# Patient Record
Sex: FEMALE | Race: OTHER | HISPANIC OR LATINO
[De-identification: names, ages, dates, MRNs, and addresses within clinical notes are randomized per-mention and may not be internally consistent; named-entity substitution may affect disease eponyms.]

---

## 2017-02-23 ENCOUNTER — RECORD ABSTRACTING (OUTPATIENT)
Age: 42
End: 2017-02-23

## 2017-02-23 DIAGNOSIS — Z87.42 PERSONAL HISTORY OF OTHER DISEASES OF THE FEMALE GENITAL TRACT: ICD-10-CM

## 2017-02-23 DIAGNOSIS — Z12.4 ENCOUNTER FOR SCREENING FOR MALIGNANT NEOPLASM OF CERVIX: ICD-10-CM

## 2017-02-23 DIAGNOSIS — O09.529 SUPERVISION OF ELDERLY MULTIGRAVIDA, UNSPECIFIED TRIMESTER: ICD-10-CM

## 2017-02-23 DIAGNOSIS — Z87.59 PERSONAL HISTORY OF OTHER COMPLICATIONS OF PREGNANCY, CHILDBIRTH AND THE PUERPERIUM: ICD-10-CM

## 2017-02-23 DIAGNOSIS — Z83.49 FAMILY HISTORY OF OTHER ENDOCRINE, NUTRITIONAL AND METABOLIC DISEASES: ICD-10-CM

## 2017-02-23 DIAGNOSIS — K21.9 GASTRO-ESOPHAGEAL REFLUX DISEASE W/OUT ESOPHAGITIS: ICD-10-CM

## 2017-04-24 ENCOUNTER — RX RENEWAL (OUTPATIENT)
Age: 42
End: 2017-04-24

## 2018-05-31 ENCOUNTER — RX RENEWAL (OUTPATIENT)
Age: 43
End: 2018-05-31

## 2019-02-08 ENCOUNTER — OUTPATIENT (OUTPATIENT)
Dept: OUTPATIENT SERVICES | Facility: HOSPITAL | Age: 44
LOS: 1 days | Discharge: HOME | End: 2019-02-08

## 2019-02-08 ENCOUNTER — RESULT REVIEW (OUTPATIENT)
Age: 44
End: 2019-02-08

## 2019-02-11 DIAGNOSIS — Z01.419 ENCOUNTER FOR GYNECOLOGICAL EXAMINATION (GENERAL) (ROUTINE) WITHOUT ABNORMAL FINDINGS: ICD-10-CM

## 2019-11-05 ENCOUNTER — EMERGENCY (EMERGENCY)
Facility: HOSPITAL | Age: 44
LOS: 0 days | Discharge: HOME | End: 2019-11-05
Attending: EMERGENCY MEDICINE | Admitting: EMERGENCY MEDICINE
Payer: COMMERCIAL

## 2019-11-05 VITALS
SYSTOLIC BLOOD PRESSURE: 135 MMHG | DIASTOLIC BLOOD PRESSURE: 75 MMHG | OXYGEN SATURATION: 98 % | RESPIRATION RATE: 20 BRPM | HEART RATE: 93 BPM | TEMPERATURE: 97 F

## 2019-11-05 VITALS
DIASTOLIC BLOOD PRESSURE: 74 MMHG | TEMPERATURE: 97 F | OXYGEN SATURATION: 100 % | SYSTOLIC BLOOD PRESSURE: 139 MMHG | RESPIRATION RATE: 18 BRPM | HEART RATE: 78 BPM

## 2019-11-05 DIAGNOSIS — R10.9 UNSPECIFIED ABDOMINAL PAIN: ICD-10-CM

## 2019-11-05 DIAGNOSIS — K57.92 DIVERTICULITIS OF INTESTINE, PART UNSPECIFIED, WITHOUT PERFORATION OR ABSCESS WITHOUT BLEEDING: ICD-10-CM

## 2019-11-05 DIAGNOSIS — D25.9 LEIOMYOMA OF UTERUS, UNSPECIFIED: ICD-10-CM

## 2019-11-05 DIAGNOSIS — K76.0 FATTY (CHANGE OF) LIVER, NOT ELSEWHERE CLASSIFIED: ICD-10-CM

## 2019-11-05 LAB
ALBUMIN SERPL ELPH-MCNC: 4.3 G/DL — SIGNIFICANT CHANGE UP (ref 3.5–5.2)
ALP SERPL-CCNC: 79 U/L — SIGNIFICANT CHANGE UP (ref 30–115)
ALT FLD-CCNC: 35 U/L — SIGNIFICANT CHANGE UP (ref 0–41)
ANION GAP SERPL CALC-SCNC: 11 MMOL/L — SIGNIFICANT CHANGE UP (ref 7–14)
APPEARANCE UR: ABNORMAL
AST SERPL-CCNC: 24 U/L — SIGNIFICANT CHANGE UP (ref 0–41)
BACTERIA # UR AUTO: NEGATIVE — SIGNIFICANT CHANGE UP
BASOPHILS # BLD AUTO: 0.02 K/UL — SIGNIFICANT CHANGE UP (ref 0–0.2)
BASOPHILS NFR BLD AUTO: 0.2 % — SIGNIFICANT CHANGE UP (ref 0–1)
BILIRUB SERPL-MCNC: <0.2 MG/DL — SIGNIFICANT CHANGE UP (ref 0.2–1.2)
BILIRUB UR-MCNC: NEGATIVE — SIGNIFICANT CHANGE UP
BUN SERPL-MCNC: 14 MG/DL — SIGNIFICANT CHANGE UP (ref 10–20)
CALCIUM SERPL-MCNC: 9.3 MG/DL — SIGNIFICANT CHANGE UP (ref 8.5–10.1)
CHLORIDE SERPL-SCNC: 101 MMOL/L — SIGNIFICANT CHANGE UP (ref 98–110)
CO2 SERPL-SCNC: 25 MMOL/L — SIGNIFICANT CHANGE UP (ref 17–32)
COLOR SPEC: YELLOW — SIGNIFICANT CHANGE UP
CREAT SERPL-MCNC: 0.7 MG/DL — SIGNIFICANT CHANGE UP (ref 0.7–1.5)
DIFF PNL FLD: NEGATIVE — SIGNIFICANT CHANGE UP
EOSINOPHIL # BLD AUTO: 0.32 K/UL — SIGNIFICANT CHANGE UP (ref 0–0.7)
EOSINOPHIL NFR BLD AUTO: 3.2 % — SIGNIFICANT CHANGE UP (ref 0–8)
EPI CELLS # UR: 3 /HPF — SIGNIFICANT CHANGE UP (ref 0–5)
GLUCOSE SERPL-MCNC: 105 MG/DL — HIGH (ref 70–99)
GLUCOSE UR QL: NEGATIVE — SIGNIFICANT CHANGE UP
HCG SERPL QL: NEGATIVE — SIGNIFICANT CHANGE UP
HCG UR QL: NEGATIVE — SIGNIFICANT CHANGE UP
HCT VFR BLD CALC: 43.6 % — SIGNIFICANT CHANGE UP (ref 37–47)
HGB BLD-MCNC: 13.9 G/DL — SIGNIFICANT CHANGE UP (ref 12–16)
HYALINE CASTS # UR AUTO: 1 /LPF — SIGNIFICANT CHANGE UP (ref 0–7)
IMM GRANULOCYTES NFR BLD AUTO: 0.3 % — SIGNIFICANT CHANGE UP (ref 0.1–0.3)
KETONES UR-MCNC: NEGATIVE — SIGNIFICANT CHANGE UP
LACTATE SERPL-SCNC: 0.8 MMOL/L — SIGNIFICANT CHANGE UP (ref 0.5–2.2)
LEUKOCYTE ESTERASE UR-ACNC: NEGATIVE — SIGNIFICANT CHANGE UP
LIDOCAIN IGE QN: 28 U/L — SIGNIFICANT CHANGE UP (ref 7–60)
LYMPHOCYTES # BLD AUTO: 2.35 K/UL — SIGNIFICANT CHANGE UP (ref 1.2–3.4)
LYMPHOCYTES # BLD AUTO: 23.5 % — SIGNIFICANT CHANGE UP (ref 20.5–51.1)
MCHC RBC-ENTMCNC: 28 PG — SIGNIFICANT CHANGE UP (ref 27–31)
MCHC RBC-ENTMCNC: 31.9 G/DL — LOW (ref 32–37)
MCV RBC AUTO: 87.7 FL — SIGNIFICANT CHANGE UP (ref 81–99)
MONOCYTES # BLD AUTO: 1.06 K/UL — HIGH (ref 0.1–0.6)
MONOCYTES NFR BLD AUTO: 10.6 % — HIGH (ref 1.7–9.3)
NEUTROPHILS # BLD AUTO: 6.21 K/UL — SIGNIFICANT CHANGE UP (ref 1.4–6.5)
NEUTROPHILS NFR BLD AUTO: 62.2 % — SIGNIFICANT CHANGE UP (ref 42.2–75.2)
NITRITE UR-MCNC: NEGATIVE — SIGNIFICANT CHANGE UP
NRBC # BLD: 0 /100 WBCS — SIGNIFICANT CHANGE UP (ref 0–0)
PH UR: 7.5 — SIGNIFICANT CHANGE UP (ref 5–8)
PLATELET # BLD AUTO: 358 K/UL — SIGNIFICANT CHANGE UP (ref 130–400)
POTASSIUM SERPL-MCNC: 4.3 MMOL/L — SIGNIFICANT CHANGE UP (ref 3.5–5)
POTASSIUM SERPL-SCNC: 4.3 MMOL/L — SIGNIFICANT CHANGE UP (ref 3.5–5)
PROT SERPL-MCNC: 7 G/DL — SIGNIFICANT CHANGE UP (ref 6–8)
PROT UR-MCNC: SIGNIFICANT CHANGE UP
RBC # BLD: 4.97 M/UL — SIGNIFICANT CHANGE UP (ref 4.2–5.4)
RBC # FLD: 14.6 % — HIGH (ref 11.5–14.5)
RBC CASTS # UR COMP ASSIST: 1 /HPF — SIGNIFICANT CHANGE UP (ref 0–4)
SODIUM SERPL-SCNC: 137 MMOL/L — SIGNIFICANT CHANGE UP (ref 135–146)
SP GR SPEC: 1.02 — SIGNIFICANT CHANGE UP (ref 1.01–1.02)
UROBILINOGEN FLD QL: ABNORMAL
WBC # BLD: 9.99 K/UL — SIGNIFICANT CHANGE UP (ref 4.8–10.8)
WBC # FLD AUTO: 9.99 K/UL — SIGNIFICANT CHANGE UP (ref 4.8–10.8)
WBC UR QL: 4 /HPF — SIGNIFICANT CHANGE UP (ref 0–5)

## 2019-11-05 PROCEDURE — 76830 TRANSVAGINAL US NON-OB: CPT | Mod: 26

## 2019-11-05 PROCEDURE — 74177 CT ABD & PELVIS W/CONTRAST: CPT | Mod: 26

## 2019-11-05 PROCEDURE — 99285 EMERGENCY DEPT VISIT HI MDM: CPT

## 2019-11-05 RX ORDER — METRONIDAZOLE 500 MG
1 TABLET ORAL
Qty: 30 | Refills: 0
Start: 2019-11-05 | End: 2019-11-14

## 2019-11-05 RX ORDER — SODIUM CHLORIDE 9 MG/ML
1000 INJECTION, SOLUTION INTRAVENOUS ONCE
Refills: 0 | Status: COMPLETED | OUTPATIENT
Start: 2019-11-05 | End: 2019-11-05

## 2019-11-05 RX ORDER — CIPROFLOXACIN LACTATE 400MG/40ML
400 VIAL (ML) INTRAVENOUS ONCE
Refills: 0 | Status: COMPLETED | OUTPATIENT
Start: 2019-11-05 | End: 2019-11-05

## 2019-11-05 RX ORDER — MORPHINE SULFATE 50 MG/1
4 CAPSULE, EXTENDED RELEASE ORAL ONCE
Refills: 0 | Status: DISCONTINUED | OUTPATIENT
Start: 2019-11-05 | End: 2019-11-05

## 2019-11-05 RX ORDER — METRONIDAZOLE 500 MG
500 TABLET ORAL ONCE
Refills: 0 | Status: COMPLETED | OUTPATIENT
Start: 2019-11-05 | End: 2019-11-05

## 2019-11-05 RX ORDER — SACCHAROMYCES BOULARDII 250 MG
1 POWDER IN PACKET (EA) ORAL
Qty: 10 | Refills: 0
Start: 2019-11-05 | End: 2019-11-14

## 2019-11-05 RX ORDER — DIPHENHYDRAMINE HCL 50 MG
50 CAPSULE ORAL EVERY 4 HOURS
Refills: 0 | Status: DISCONTINUED | OUTPATIENT
Start: 2019-11-05 | End: 2019-11-05

## 2019-11-05 RX ORDER — MOXIFLOXACIN HYDROCHLORIDE TABLETS, 400 MG 400 MG/1
1 TABLET, FILM COATED ORAL
Qty: 20 | Refills: 0
Start: 2019-11-05 | End: 2019-11-14

## 2019-11-05 RX ADMIN — Medication 100 MILLIGRAM(S): at 17:38

## 2019-11-05 RX ADMIN — Medication 50 MILLIGRAM(S): at 15:15

## 2019-11-05 RX ADMIN — MORPHINE SULFATE 4 MILLIGRAM(S): 50 CAPSULE, EXTENDED RELEASE ORAL at 13:52

## 2019-11-05 RX ADMIN — Medication 125 MILLIGRAM(S): at 15:24

## 2019-11-05 RX ADMIN — Medication 200 MILLIGRAM(S): at 18:04

## 2019-11-05 RX ADMIN — SODIUM CHLORIDE 1000 MILLILITER(S): 9 INJECTION, SOLUTION INTRAVENOUS at 12:51

## 2019-11-05 NOTE — ED PROVIDER NOTE - OBJECTIVE STATEMENT
45 y/o f w/ pmhx of pcos (not on meds), asthma presents with ~ 3 days of lower pelvic pain, sharp, radiates from mid pelvic region band-like to back, associated with oral temp of 99.8 (took ibuprofen for pain over these past few days, did not take anything for it today), chills, and urinary frequency. lmp sept 11, stopped her ocp's in august as she has been on it since she was 16 years old. spoke to her ob-gyn over the weekend and was told to take u preg, one was false positive. another was negative. and then told today to come to ed to get a serum pregnancy test as well. last pap smear was february 8th, and was told she had hpv. denies n/v, cp, sob, pleuritic chest pain, palpitations, diaphoresis, cough, diarrhea, constipation, melena/brbpr,  burning/dysuria/hematuria, back/ flank pain, vaginal bleeding/discharge/odor, sick contacts, recent travel or rash. had been having normal bm.

## 2019-11-05 NOTE — ED ADULT NURSE NOTE - NSIMPLEMENTINTERV_GEN_ALL_ED
Implemented All Universal Safety Interventions:  [place] to call system. Call bell, personal items and telephone within reach. Instruct patient to call for assistance. Room bathroom lighting operational. Non-slip footwear when patient is off stretcher. Physically safe environment: no spills, clutter or unnecessary equipment. Stretcher in lowest position, wheels locked, appropriate side rails in place.

## 2019-11-05 NOTE — ED PROVIDER NOTE - CLINICAL SUMMARY MEDICAL DECISION MAKING FREE TEXT BOX
pt aware of all labs and imaging, including fatty liver, uterine fibroid and diverticulitis, abx sent to pharmacy aware of signs and symptoms to return for, follow up with pmd and gi, t feeling better ,would like to go home.

## 2019-11-05 NOTE — ED PROVIDER NOTE - PLAN OF CARE
PLan: Labs, ivf, pt does not want anything for the pain at this time, pregnancy test, urine, imaging, reassess. Plan: Labs, ivf, pt does not want anything for the pain at this time, pregnancy test, urine, imaging, reassess.

## 2019-11-05 NOTE — ED PROVIDER NOTE - PROGRESS NOTE DETAILS
pt feeling much better, abd re exam soft ntnd no r./g, tolerated po, would like to go home, no fever, no white count, aware of all labs and findings including Subserosal uterine fibroid (follow up with ob-gyn) and diverituclits with extensive conversation regarding this, signs and symptoms associated, [place] diet and follow up with gi, pt aware of signs and symptoms to return for, will follow up with pmd and gi.

## 2019-11-05 NOTE — ED PROVIDER NOTE - CARE PLAN
Assessment and plan of treatment:	PLan: Labs, ivf, pt does not want anything for the pain at this time, pregnancy test, urine, imaging, reassess. Principal Discharge DX:	Diverticulitis  Assessment and plan of treatment:	Plan: Labs, ivf, pt does not want anything for the pain at this time, pregnancy test, urine, imaging, reassess.  Secondary Diagnosis:	Uterine fibroid  Secondary Diagnosis:	Fatty liver

## 2019-11-05 NOTE — ED PROVIDER NOTE - NS ED ROS FT
Constitutional:  No behavior changes, (+) Fever and chills    Head: No injury, headache, LOC, dizziness/LH.    Neck:  No pain, stiffness, injury; no loss of range of motion.    Cardiac: No chest pain, palpitations, diaphoresis.    Chest/respiratory: No cough, wheezing, shortness of breath, chest tightness, or trouble breathing; no injuries.    GI: No nausea, vomiting, diarrhea ; no injuries. No changes in PO intake. No melena/brbpr. (+) Pelvic pain.     :  No dysuria, hematuria, urgency, or injuries. (+) Frequency. No flank pain.     MS: No pain, weakness, injury, numbness or tingling; no loss of range of motion. No edema. No calf pain/swelling/erythema.    Back:  No pain or injury.    Skin:  No rashes or color changes; no lacerations or abrasions.  Social: No sick contacts, recent travel or rash.

## 2019-11-05 NOTE — ED ADULT NURSE NOTE - OBJECTIVE STATEMENT
Pt presented with c/o lower abd/pelvic pain that began last week. Denies n/v. Endorses intermittent fevers at home. No recent trauma noted. Activity exacerbates pain. Stated she took an at home pregnancy test that results positive, but went to an C that resulted a negative pregnancy test. Alert and oriented x3. No obvious trauma or deformities noted.

## 2019-11-05 NOTE — ED PROVIDER NOTE - PHYSICAL EXAMINATION
Vital Signs: I have reviewed the initial vital signs.  Constitutional: WDWN in nad. Sitting on stretcher speaking full sentences.   Integumentary: No rash.  ENT: MMM  NECK: Supple, non-tender, no meningeal signs.  Cardiovascular: RRR, radial pulses 2/4 b/l. No JVD.  Respiratory: BS present b/l, ctabl, no wheezing or crackles, good air exchange, good resp effort and excursion, no accessory muscle use, no stridor.  Gastrointestinal: BS present throughout all 4 quadrants, soft, nd, tenderness to palpation to mid pelvic and r and l lower pelvic regions no rebound tenderness or guarding, no cvat. (-) murphys (-) rovsing (-) obturator (-) psoas  Musculoskeletal: FROM, no edema, no calf pain/swelling/erythema.  Neurologic: AAOx3, motor 5/5 and sensation intact throughout upper and lower ext, CN II-XII intact, No facial droop or slurring of speech. No focal deficits.

## 2019-11-05 NOTE — ED PROVIDER NOTE - CARE PROVIDERS DIRECT ADDRESSES
[email or web address]

## 2019-11-05 NOTE — ED PROVIDER NOTE - PATIENT PORTAL LINK FT
You can access the FollowMyHealth Patient Portal offered by [facility] by registering at the following website: [email or web address]. By joining [facility]’s FollowMyHealth portal, you will also be able to view your health information using other applications (apps) compatible with our system.

## 2019-11-05 NOTE — ED PROVIDER NOTE - NSFOLLOWUPINSTRUCTIONS_ED_ALL_ED_FT
Diverticulitis    Diverticulitis is inflammation or infection of small pouches in your colon that form when you HAVE a condition called diverticulosis. This condition can range from mild to severe potentially leading to perforation or obstructions of your colon. Symptoms include abdominal pain, fever/chills, nausea, vomiting, diarrhea, constipation, or blood in your stool. If you were prescribed an antibiotic medicine, take it as told by your health care provider. Do not stop taking the antibiotic even if you start to feel better.    SEEK IMMEDIATE MEDICAL CARE IF YOU HAVE ANY OF THE FOLLOWING SYMPTOMS: worsening abdominal pain, high fever, inability to hold down liquids or medication, black or bloody stools, inability to pass gas, lightheadedness/dizziness, or a change in mental status.      Nonalcoholic Fatty Liver Disease Diet    Nonalcoholic fatty liver disease is a condition that causes fat to accumulate in and around the liver. The disease makes it harder for the liver to work the way that it should. Following a healthy diet can help to keep nonalcoholic fatty liver disease under control. It can also help to prevent or improve conditions that are associated with the disease, such as heart disease, diabetes, high blood pressure, and abnormal cholesterol levels. Along with regular exercise, this diet:    Promotes weight loss.  Helps to control blood sugar levels.  Helps to improve the way that the body uses insulin.    WHAT DO I NEED TO KNOW ABOUT THIS DIET?  Use the glycemic index (GI) to plan your meals. The index tells you how quickly a food will raise your blood sugar. Choose low-GI foods. These foods take a longer time to raise blood sugar.  Keep track of how many calories you take in. Eating the right amount of calories will help you to achieve a healthy weight.  You may want to follow a Mediterranean diet. This diet includes a lot of vegetables, lean meats or fish, whole grains, fruits, and healthy oils and fats.    WHAT FOODS CAN I EAT?  Grains    Whole grains, such as whole-wheat or whole-grain breads, crackers, tortillas, cereals, and pasta. Stone-ground whole wheat. Pumpernickel bread. Unsweetened oatmeal. Bulgur. Barley. Quinoa. Brown or wild rice. Corn or whole-wheat flour tortillas.    Vegetables    Lettuce. Spinach. Peas. Beets. Cauliflower. Cabbage. Broccoli. Carrots. Tomatoes. Squash. Eggplant. Herbs. Peppers. Onions. Cucumbers. [place] sprouts. Yams and sweet potatoes. Beans. Lentils.    Fruits    Bananas. Apples. Oranges. Grapes. Papaya. Chun. Pomegranate. Kiwi. Grapefruit. Cherries.    Meats and Other Protein Sources    Seafood and shellfish. Lean meats. Poultry. Tofu.    Dairy    Low-fat or fat-free dairy products, such as yogurt, cottage cheese, and cheese.    Beverages    Water. Sugar-free drinks. Tea. Coffee. Low-fat or skim milk. Milk alternatives, such as soy or almond milk. Real fruit juice.    Condiments    Mustard. Relish. Low-fat, low-sugar ketchup and barbecue sauce. Low-fat or fat-free mayonnaise.    Sweets and Desserts    Sugar-free sweets.    Fats and Oils    Avocado. Canola or olive oil. Nuts and nut butters. Seeds.    The items listed above may not be a complete list of recommended foods or beverages. Contact your dietitian for more options.     WHAT FOODS ARE NOT RECOMMENDED?  Palm oil and coconut oil. Processed foods. Fried foods. Sweetened drinks, such as sweet tea, milkshakes, snow cones, iced sweet drinks, and sodas. Alcohol. Sweets. Foods that contain a lot of salt or sodium.    The items listed above may not be a complete list of foods and beverages to avoid. Contact your dietitian for more information.    ADDITIONAL NOTES AND INSTRUCTIONS    Please follow up with your Primary MD in 24-48 hr.  Seek immediate medical care for any new/worsening signs or symptoms.       Uterine Fibroids    Uterine fibroids are tissue masses (tumors) that can develop in the womb (uterus). They are also called leiomyomas. This type of tumor is not cancerous (benign) and does not spread to other parts of the body outside of the pelvic area, which is between the hip bones. Occasionally, fibroids may develop in the fallopian tubes, in the cervix, or on the support structures (ligaments) that surround the uterus.    You can have one or many fibroids. Fibroids can vary in size, weight, and where they grow in the uterus. Some can become quite large. Most fibroids do not require medical treatment.     CAUSES  A fibroid can develop when a single uterine cell keeps growing (replicating). Most cells in the human body have a control mechanism that keeps them from replicating without control.    SIGNS AND SYMPTOMS  Symptoms may include:     Heavy bleeding during your period.  Bleeding or spotting between periods.  Pelvic pain and pressure.  Bladder problems, such as needing to urinate more often (urinary frequency) or urgently.  Inability to reproduce offspring (infertility).  Miscarriages.    DIAGNOSIS  Uterine fibroids are diagnosed through a physical exam. Your health care provider may feel the lumpy tumors during a pelvic exam. Ultrasonography and an MRI may be done to determine the size, location, and number of fibroids.    TREATMENT  Treatment may include:    Watchful waiting. This involves getting the fibroid checked by your health care provider to see if it grows or shrinks. Follow your health care provider's recommendations for how often to have this checked.  Hormone medicines. These can be taken by mouth or given through an intrauterine device (IUD).  Surgery.  Removing the fibroids (myomectomy) or the uterus (hysterectomy).  Removing blood supply to the fibroids (uterine artery embolization).    If fibroids interfere with your fertility and you want to become pregnant, your health care provider may recommend having the fibroids removed.     HOME CARE INSTRUCTIONS  Keep all follow-up visits as directed by your health care provider. This is important.  Take over-the-counter and prescription medicines only as told by your health care provider.  If you were prescribed a hormone treatment, take the hormone medicines exactly as directed.  Ask your health care provider about taking iron pills and increasing the amount of dark green, leafy vegetables in your diet. These actions can help to boost your blood iron levels, which may be affected by heavy menstrual bleeding.  Pay close attention to your period and tell your health care provider about any changes, such as:  Increased blood flow that requires you to use more pads or tampons than usual per month.  A change in the number of days that your period lasts per month.  A change in symptoms that are associated with your period, such as abdominal cramping or back pain.    SEEK MEDICAL CARE IF:  You have pelvic pain, back pain, or abdominal cramps that cannot be controlled with medicines.  You have an increase in bleeding between and during periods.  You soak tampons or pads in a half hour or less.  You feel lightheaded, extra tired, or weak.    SEEK IMMEDIATE MEDICAL CARE IF:  You faint.  You have a sudden increase in pelvic pain.    ADDITIONAL NOTES AND INSTRUCTIONS    Please follow up with your Primary MD in 24-48 hr.  Seek immediate medical care for any new/worsening signs or symptoms.

## 2019-11-05 NOTE — ED PROVIDER NOTE - CARE PROVIDER_API CALL
Moy Baron)  Gastroenterology; Internal Medicine  [address] [place] [address]  Phone: [phone]  Fax: [phone]  Follow Up Time:     Radha Hernandez)  Gastroenterology  [address] [place] [address]  Phone: [phone]  Fax: [phone]  Follow Up Time:

## 2019-11-06 PROBLEM — B97.7 PAPILLOMAVIRUS AS THE CAUSE OF DISEASES CLASSIFIED ELSEWHERE: Chronic | Status: ACTIVE | Noted: 2019-11-05

## 2019-11-06 PROBLEM — E28.2 POLYCYSTIC OVARIAN SYNDROME: Chronic | Status: ACTIVE | Noted: 2019-11-05

## 2019-11-06 PROBLEM — J45.909 UNSPECIFIED ASTHMA, UNCOMPLICATED: Chronic | Status: ACTIVE | Noted: 2019-11-05

## 2019-11-06 LAB
CULTURE RESULTS: SIGNIFICANT CHANGE UP
SPECIMEN SOURCE: SIGNIFICANT CHANGE UP

## 2020-02-13 DIAGNOSIS — K57.32 DIVERTICULITIS OF LARGE INTESTINE W/OUT PERFORATION OR ABSCESS W/OUT BLEEDING: ICD-10-CM

## 2020-02-13 DIAGNOSIS — B97.7 PAPILLOMAVIRUS AS THE CAUSE OF DISEASES CLASSIFIED ELSEWHERE: ICD-10-CM

## 2020-02-14 ENCOUNTER — APPOINTMENT (OUTPATIENT)
Dept: OBGYN | Facility: CLINIC | Age: 45
End: 2020-02-14
Payer: COMMERCIAL

## 2020-02-14 VITALS
WEIGHT: 167 LBS | SYSTOLIC BLOOD PRESSURE: 122 MMHG | HEIGHT: 66 IN | DIASTOLIC BLOOD PRESSURE: 72 MMHG | BODY MASS INDEX: 26.84 KG/M2

## 2020-02-14 PROCEDURE — 99396 PREV VISIT EST AGE 40-64: CPT

## 2020-02-14 NOTE — PHYSICAL EXAM
[Alert] : alert [Awake] : awake [Acute Distress] : no acute distress [Mass] : no breast mass [Axillary LAD] : no axillary lymphadenopathy [Nipple Discharge] : no nipple discharge [Tender] : non tender [Soft] : soft [Oriented x3] : oriented to person, place, and time [Labia Majora] : labia major [Labia Minora] : labia minora [Normal] : clitoris [No Bleeding] : there was no active vaginal bleeding [Uterine Adnexae] : were not tender and not enlarged

## 2020-02-14 NOTE — HISTORY OF PRESENT ILLNESS
[Last Pap ___] : Last cervical pap smear was [unfilled] [Perimenopausal] : is perimenopausal [de-identified] : up to date [Male ___] : [unfilled] male [Sexually Active] : is sexually active

## 2020-02-18 LAB — HPV HIGH+LOW RISK DNA PNL CVX: NOT DETECTED

## 2021-03-12 ENCOUNTER — APPOINTMENT (OUTPATIENT)
Dept: OBGYN | Facility: CLINIC | Age: 46
End: 2021-03-12
Payer: COMMERCIAL

## 2021-03-12 VITALS
DIASTOLIC BLOOD PRESSURE: 78 MMHG | HEIGHT: 66 IN | SYSTOLIC BLOOD PRESSURE: 120 MMHG | BODY MASS INDEX: 26.68 KG/M2 | WEIGHT: 166 LBS

## 2021-03-12 PROCEDURE — 99072 ADDL SUPL MATRL&STAF TM PHE: CPT

## 2021-03-12 PROCEDURE — 99396 PREV VISIT EST AGE 40-64: CPT

## 2021-03-12 NOTE — PLAN
[FreeTextEntry1] : Routine gyn\par PAP/HPV due to hx hpv\par Mammo referral\par OCPs sent to pharmacy\par Primary care with pmd\par Pt to rv in 1 year or prn

## 2021-03-12 NOTE — HISTORY OF PRESENT ILLNESS
[Regular Cycle Intervals] : periods have been regular [No] : Patient does not have concerns regarding sex [PapSmeardate] : 2020 [FreeTextEntry1] : 2/24/21

## 2021-03-17 LAB — HPV HIGH+LOW RISK DNA PNL CVX: NOT DETECTED

## 2021-03-22 LAB — CYTOLOGY CVX/VAG DOC THIN PREP: ABNORMAL

## 2022-03-25 ENCOUNTER — APPOINTMENT (OUTPATIENT)
Dept: OBGYN | Facility: CLINIC | Age: 47
End: 2022-03-25
Payer: COMMERCIAL

## 2022-03-25 VITALS
BODY MASS INDEX: 28.69 KG/M2 | DIASTOLIC BLOOD PRESSURE: 84 MMHG | WEIGHT: 178.5 LBS | HEIGHT: 66 IN | SYSTOLIC BLOOD PRESSURE: 150 MMHG

## 2022-03-25 PROCEDURE — 99396 PREV VISIT EST AGE 40-64: CPT

## 2022-03-25 RX ORDER — NORETHINDRONE AND ETHINYL ESTRADIOL 1 MG-35MCG
1-35 KIT ORAL DAILY
Qty: 28 | Refills: 6 | Status: DISCONTINUED | COMMUNITY
Start: 2017-04-24 | End: 2022-03-25

## 2022-03-25 RX ORDER — NORETHINDRONE AND ETHINYL ESTRADIOL 1 MG-35MCG
1-35 KIT ORAL DAILY
Qty: 28 | Refills: 6 | Status: DISCONTINUED | COMMUNITY
Start: 2018-05-31 | End: 2022-03-25

## 2022-03-25 RX ORDER — NORETHINDRONE AND ETHINYL ESTRADIOL 1 MG-35MCG
1-35 KIT ORAL
Qty: 1 | Refills: 5 | Status: DISCONTINUED | COMMUNITY
Start: 2020-10-14 | End: 2022-03-25

## 2022-03-25 RX ORDER — NORETHINDRONE AND ETHINYL ESTRADIOL 1 MG-35MCG
1-35 KIT ORAL DAILY
Qty: 28 | Refills: 6 | Status: DISCONTINUED | COMMUNITY
Start: 2021-12-03 | End: 2022-03-25

## 2022-03-25 RX ORDER — NORETHINDRONE AND ETHINYL ESTRADIOL 1 MG-35MCG
1-35 KIT ORAL DAILY
Qty: 28 | Refills: 6 | Status: DISCONTINUED | COMMUNITY
Start: 2020-02-14 | End: 2022-03-25

## 2022-03-25 NOTE — PLAN
[FreeTextEntry1] : Routine gyn\par fsh/amh/estradiol ordered\par pt sent for tv/us\par mammo and breast u/s ref given for dense breasts\par pt to rv in 1 month or prn

## 2022-03-26 LAB
ESTRADIOL SERPL-MCNC: 5 PG/ML
FSH SERPL-MCNC: 73.1 IU/L

## 2022-04-03 LAB — ANTI-MUELLERIAN HORMONE: <0.015 NG/ML

## 2022-04-22 ENCOUNTER — APPOINTMENT (OUTPATIENT)
Dept: OBGYN | Facility: CLINIC | Age: 47
End: 2022-04-22
Payer: COMMERCIAL

## 2022-04-22 PROCEDURE — 99213 OFFICE O/P EST LOW 20 MIN: CPT

## 2022-04-22 NOTE — PLAN
[FreeTextEntry1] : Test results discussed\par will follow with yearly sonograms to follow fibroids\par pt to rv in 1 year or prn

## 2022-04-22 NOTE — HISTORY OF PRESENT ILLNESS
[FreeTextEntry1] : Pt with a known hx of fibroids. Pt here for test results. Pt doing well. No gyn issues. Pt being followed for hepatic lesions. [Currently In Menopause] : currently in menopause [No] : Patient does not have concerns regarding sex

## 2023-07-21 ENCOUNTER — APPOINTMENT (OUTPATIENT)
Dept: OBGYN | Facility: CLINIC | Age: 48
End: 2023-07-21
Payer: COMMERCIAL

## 2023-07-21 VITALS — SYSTOLIC BLOOD PRESSURE: 118 MMHG | WEIGHT: 169.2 LBS | DIASTOLIC BLOOD PRESSURE: 78 MMHG | BODY MASS INDEX: 27.31 KG/M2

## 2023-07-21 DIAGNOSIS — Z01.419 ENCOUNTER FOR GYNECOLOGICAL EXAMINATION (GENERAL) (ROUTINE) W/OUT ABNORMAL FINDINGS: ICD-10-CM

## 2023-07-21 DIAGNOSIS — R92.2 INCONCLUSIVE MAMMOGRAM: ICD-10-CM

## 2023-07-21 DIAGNOSIS — D21.9 BENIGN NEOPLASM OF CONNECTIVE AND OTHER SOFT TISSUE, UNSPECIFIED: ICD-10-CM

## 2023-07-21 PROCEDURE — 99396 PREV VISIT EST AGE 40-64: CPT

## 2023-07-21 NOTE — PLAN
[FreeTextEntry1] : Routine gyn\par pap/hpv\par breast u/s\par tv/us\par will call patient if enlarged fibroid\par rv in 1 year or prn

## 2023-07-21 NOTE — HISTORY OF PRESENT ILLNESS
[FreeTextEntry1] : 49 y/o , LMP 1/2022, here today for annual exam. Pt with a known history of fibroids. Denies abnormal vaginal discharge, pelvic pain or urinary symptoms. Denies PMB.  \par \par Last Annual: 3/2022\par Last PAP: 3/2021\par Last mammo: 5/2023/ birads 1 with dense breasts\par Pelvic sono scheduled next week. \par \par \par  [Currently In Menopause] : currently in menopause [No] : Patient does not have concerns regarding sex

## 2023-07-21 NOTE — PHYSICAL EXAM
[Chaperone Present] : A chaperone was present in the examining room during all aspects of the physical examination [Appropriately responsive] : appropriately responsive [Alert] : alert [Soft] : soft [Non-tender] : non-tender [Non-distended] : non-distended [Oriented x3] : oriented x3 [Examination Of The Breasts] : a normal appearance [No Masses] : no breast masses were palpable [Labia Majora] : normal [Labia Minora] : normal [Normal] : normal [Enlarged ___ wks] : enlarged [unfilled] ~Uweeks [Uterine Adnexae] : normal [No Tenderness] : no tenderness [Nl Sphincter Tone] : normal sphincter tone

## 2023-07-25 LAB — HPV HIGH+LOW RISK DNA PNL CVX: NOT DETECTED

## 2023-07-26 LAB — CYTOLOGY CVX/VAG DOC THIN PREP: NORMAL

## 2024-10-11 ENCOUNTER — APPOINTMENT (OUTPATIENT)
Dept: OBGYN | Facility: CLINIC | Age: 49
End: 2024-10-11
Payer: COMMERCIAL

## 2024-10-11 VITALS — DIASTOLIC BLOOD PRESSURE: 76 MMHG | SYSTOLIC BLOOD PRESSURE: 126 MMHG

## 2024-10-11 VITALS — BODY MASS INDEX: 27.32 KG/M2 | WEIGHT: 170 LBS | HEIGHT: 66 IN

## 2024-10-11 DIAGNOSIS — Z01.419 ENCOUNTER FOR GYNECOLOGICAL EXAMINATION (GENERAL) (ROUTINE) W/OUT ABNORMAL FINDINGS: ICD-10-CM

## 2024-10-11 PROCEDURE — 99396 PREV VISIT EST AGE 40-64: CPT
